# Patient Record
Sex: MALE | Race: WHITE
[De-identification: names, ages, dates, MRNs, and addresses within clinical notes are randomized per-mention and may not be internally consistent; named-entity substitution may affect disease eponyms.]

---

## 2019-07-28 ENCOUNTER — HOSPITAL ENCOUNTER (EMERGENCY)
Dept: HOSPITAL 91 - FTE | Age: 34
Discharge: HOME | End: 2019-07-28
Payer: MEDICAID

## 2019-07-28 ENCOUNTER — HOSPITAL ENCOUNTER (EMERGENCY)
Dept: HOSPITAL 10 - FTE | Age: 34
Discharge: HOME | End: 2019-07-28
Payer: MEDICAID

## 2019-07-28 VITALS
BODY MASS INDEX: 30.78 KG/M2 | HEIGHT: 65 IN | HEIGHT: 65 IN | WEIGHT: 184.75 LBS | WEIGHT: 184.75 LBS | BODY MASS INDEX: 30.78 KG/M2

## 2019-07-28 VITALS — SYSTOLIC BLOOD PRESSURE: 121 MMHG | RESPIRATION RATE: 18 BRPM | HEART RATE: 93 BPM | DIASTOLIC BLOOD PRESSURE: 78 MMHG

## 2019-07-28 DIAGNOSIS — L05.92: Primary | ICD-10-CM

## 2019-07-28 PROCEDURE — 99283 EMERGENCY DEPT VISIT LOW MDM: CPT

## 2019-07-28 PROCEDURE — 10080 I&D PILONIDAL CYST SIMPLE: CPT

## 2019-07-28 RX ADMIN — ONDANSETRON 1 MG: 4 TABLET, ORALLY DISINTEGRATING ORAL at 20:41

## 2019-07-28 RX ADMIN — IBUPROFEN 1 MG: 800 TABLET, FILM COATED ORAL at 20:42

## 2019-07-28 RX ADMIN — LIDOCAINE HYDROCHLORIDE AND EPINEPHRINE 1 ML: 10; 10 INJECTION, SOLUTION INFILTRATION; PERINEURAL at 20:55

## 2019-07-28 RX ADMIN — HYDROCODONE BITARTRATE AND ACETAMINOPHEN 1 TAB: 10; 325 TABLET ORAL at 20:42

## 2019-07-28 RX ADMIN — SULFAMETHOXAZOLE AND TRIMETHOPRIM 1 TAB: 800; 160 TABLET ORAL at 20:59

## 2019-07-28 RX ADMIN — CEPHALEXIN 1 MG: 500 CAPSULE ORAL at 20:59

## 2019-07-28 NOTE — ERD
ER Documentation


Chief Complaint


Chief Complaint





states abscess coccyx area x 5 days





HPI


This is a 34-year-old male who presents emergency department with complaints of 


abscess to his tailbone that started 5 days ago. 





Denies headache, head injury, loss of consciousness, dizziness, neck pain, neck 


stiffness, throat pain, difficulty swallowing, difficulty breathing lying flat, 


shoulder pain, chest pain, back pain, abdominal pain, nausea, vomiting, 


constipation, diarrhea, urinary symptoms, loss of bowel and bladder control, 


trauma, injury, falls, difficulty walking due to pain, numbness or tingling 


sensation, calf pain, recent travel, recent major surgery in the last 3 weeks, 


calf pain, recent long travel, recent exposure to any illness, recent antibiotic


use in the last 3 months, fever, chills, seizures.





Past medical history: Denies.


Surgical history: Denies.


Social: Denies smoking, use of alcoholic beverages, use of illegal drugs.





ROS


All systems reviewed and are negative except as per history of present illness.





Medications


Home Meds


Active Scripts


Hydrocodone/Acetaminophen (Norco  Tablet) 1 Each Tablet, 1 TAB PO Q6H PRN 


for SEVERE PAIN LEVEL 7-10, #5 TAB


   Prov:PASILABAN,EVELIAAR F         7/28/19


Ibuprofen* (Motrin*) 800 Mg Tab, 800 MG PO Q8 PRN for PAIN AND OR ELEVATED TEMP,


#30 TAB


   Prov:PASILABAN,EVELIAAR F         7/28/19


Cephalexin* (Keflex*) 500 Mg Capsule, 500 MG PO TID for 7 Days, CAP


   Prov:PASILABAN,KLAR F         7/28/19


Sulfamethoxazole/Trimethoprim* (Bactrim Ds* Tablet) 1 Each Tablet, 1 TAB PO BID 


for 7 Days, #14 TAB


   Prov:PASILABAN,EVELIAAR F         7/28/19


Sulfamethoxazole-Trimethoprim* (Bactrim* DS) 800-160 Mg Tab, 1 TAB PO BID for 7 


Days, TAB


   Prov:PROYUDITH YOU-C         11/4/16


Cephalexin* (Keflex*) 500 Mg Capsule, 500 MG PO QID for 7 Days, CAP


   Prov:PROYUDITH YOU-C         11/4/16


Ibuprofen* (Motrin*) 800 Mg Tab, 800 MG PO Q6, #30 TAB


   Prov:PROYUDITH YOU-C         11/4/16


Hydrocodone/Acetaminophen (Norco  Tablet) 1 Each Tablet, 1 TAB PO Q6H PRN 


for PAIN, #12 TAB


   Prov:MERONYUDITH KUMAR PA-C         11/4/16





Allergies


Allergies:  


Coded Allergies:  


     No Known Allergy (Unverified , 11/4/16)





PMhx/Soc


Hx Alcohol Use:  No


Hx Substance Use:  No


Hx Tobacco Use:  No





Physical Exam


Vitals





Vital Signs


  Date      Temp  Pulse  Resp  B/P (MAP)   Pulse Ox  O2          O2 Flow    FiO2


Time                                                 Delivery    Rate


   7/28/19  98.3     93    18      121/78        98


     21:54                           (92)


   7/28/19  99.0    107    20      131/90        98


     19:26                          (104)





Physical Exam


Const:   No acute distress


Head:   Atraumatic 


Eyes:    Normal Conjunctiva. 


ENT:    Normal External Ears, Nose and Mouth.


Neck:               Full range of motion. No meningismus.


Resp:   Clear to auscultation bilaterally


Cardio:   Regular rate and rhythm, no murmurs


Abd:    Soft, non tender, non distended. Normal bowel sounds.  Negative Ornelas 


sign.  Negative Markle sign (heel jar test).  Negative psoas sign.  Negative 


Rovsing sign.  Able to jump 10 times without developing lower abdominal pain.  


No CVA tenderness.  Ambulatory with steady gait and without pain to abdomen.


Skin:   No petechiae or rashes.  Color appears normal for ethnicity.  No skin 


tenting.  No signs of severe dehydration.  Noted swelling/redness to coccyx area


with warmness to touch.  Rectal area: No hemorrhoids.  No signs of perirectal 


abscess.


Back:   No midline or flank tenderness


Ext:    No cyanosis, or edema


Neur:   Awake and alert.  No neurological deficits.


Psych:    Normal Mood and Affect


Results 24 hrs





Current Medications


 Medications
   Dose
          Sig/Mo
       Start Time
   Status  Last


 (Trade)       Ordered        Route
 PRN     Stop Time              Admin
Dose


                              Reason                                Admin


 Lidocaine/
    50 ml          ONCE  ONCE
    7/28/19       DC       



Epinephrine
                  INJ
           20:30



(Xylocaine                                   7/28/19 20:31


1%/
 Epi


(Mdv))


                1 tab          ONCE  ONCE
    7/28/19       DC           7/28/19


Acetaminophen                 PO
            20:30
                       20:42



/
                                           7/28/19 20:31


Hydrocodone


Bitart



(Norco


(10/325))


 Ondansetron    4 mg           ONCE  STAT
    7/28/19       DC           7/28/19


HCl
  (Zofran                 ODT
           20:24
                       20:41



Odt)                                         7/28/19 20:26


 Ibuprofen
     800 mg         ONCE  ONCE
    7/28/19       DC           7/28/19


(Motrin)                      PO
            20:30
                       20:42



                                             7/28/19 20:31


 Cephalexin
    500 mg         ONCE  ONCE
    7/28/19       DC           7/28/19


(Keflex)                      PO
            21:00
                       20:59



                                             7/28/19 21:01


                1 tab          ONCE  ONCE
    7/28/19       DC           7/28/19


Trimethoprim/                 PO
            21:00
                       20:59




                                            7/28/19 21:01


Sulfamethoxaz


ole



(Bactrim


(Ds))








Procedures/MDM


Diagnostic tests: Clinical exam.





Treatment: Norco p.o.  Motrin.





Procedure: Incision and drainage of pilonidal cyst.  Verbal consent was taken 


from the patient and family member.


Betadine prep.  Lidocaine 1% with epi 2 cc subcu.  Use scalpel to incised the 


cyst.  Drained copious mucopurulent drainage.  Packed with iodoform.





Dressing was applied by EMT.





Re-evaluation: No active bleeding.  Ambulatory with steady gait and without 


pain.  Stated that he feels much better this time and that he is ready to go 


home.  Stated that he is comfortable to go home.





Differential diagnosis


I have low suspicion for sepsis, perirectal abscess.





Final diagnosis: Pilonidal cyst with incision and drainage.





Prescription: Bactrim.  Keflex.  Norco.  Motrin.





Follow-up with PCP in the next 24-48 hours.  Come back in 2 days for removal of 


packing and/or repacking. Come back here in the emergency department for any new


symptoms or any worsening symptoms.  





All questions and concerns were answered.  Patient and family members verbalized


understanding and agreed with plan of care.  





Hemodynamically stable on discharge.





Departure


Diagnosis:  


   Primary Impression:  


   Pilonidal cyst with abscess


Condition:  Stable





Additional Instructions:  


Follow-up with PCP in the next 24-48 hours.  Come back in 2 days for removal of 


packing and/or repacking. Come back here in the emergency department for any new


symptoms or any worsening symptoms.











REUBEN LOCKETT               Jul 28, 2019 20:26

## 2019-07-31 ENCOUNTER — HOSPITAL ENCOUNTER (EMERGENCY)
Dept: HOSPITAL 10 - FTE | Age: 34
Discharge: HOME | End: 2019-07-31
Payer: MEDICAID

## 2019-07-31 ENCOUNTER — HOSPITAL ENCOUNTER (EMERGENCY)
Dept: HOSPITAL 91 - FTE | Age: 34
Discharge: HOME | End: 2019-07-31
Payer: MEDICAID

## 2019-07-31 VITALS — SYSTOLIC BLOOD PRESSURE: 144 MMHG | RESPIRATION RATE: 18 BRPM | HEART RATE: 98 BPM | DIASTOLIC BLOOD PRESSURE: 95 MMHG

## 2019-07-31 VITALS
WEIGHT: 186.51 LBS | WEIGHT: 186.51 LBS | HEIGHT: 65 IN | HEIGHT: 65 IN | BODY MASS INDEX: 31.07 KG/M2 | BODY MASS INDEX: 31.07 KG/M2

## 2019-07-31 DIAGNOSIS — Z48.01: Primary | ICD-10-CM

## 2019-07-31 PROCEDURE — 99281 EMR DPT VST MAYX REQ PHY/QHP: CPT

## 2019-07-31 NOTE — ERD
ER Documentation


Chief Complaint


Chief Complaint





recheck abscess lower back, was here 2 days ago





HPI


34-year-old male no significant past history presents for recheck of a pilonidal


abscess that was drained about 2 days ago here in the ER.  She denies any fevers


or chills.  Denies chest pain or shortness of breath.  He thinks that the wound 


is healing well.  He is on antibiotics currently.  No other modifying factors 


noted.  No other treatments tried.





ROS


All systems reviewed and are negative except as per history of present illness.





Medications


Home Meds


Active Scripts


Hydrocodone/Acetaminophen (Norco  Tablet) 1 Each Tablet, 1 TAB PO Q6H PRN 


for SEVERE PAIN LEVEL 7-10, #5 TAB


   Prov:PASILABANREUBEN F         7/28/19


Ibuprofen* (Motrin*) 800 Mg Tab, 800 MG PO Q8 PRN for PAIN AND OR ELEVATED TEMP,


#30 TAB


   Prov:PASILABANEVELIAAR F         7/28/19


Cephalexin* (Keflex*) 500 Mg Capsule, 500 MG PO TID for 7 Days, CAP


   Prov:PASILABAN,EVELIAAR F         7/28/19


Sulfamethoxazole/Trimethoprim* (Bactrim Ds* Tablet) 1 Each Tablet, 1 TAB PO BID 


for 7 Days, #14 TAB


   Prov:PASILABANREUBEN F         7/28/19


Sulfamethoxazole-Trimethoprim* (Bactrim* DS) 800-160 Mg Tab, 1 TAB PO BID for 7 


Days, TAB


   Prov:YUDITH CHANCE-C         11/4/16


Cephalexin* (Keflex*) 500 Mg Capsule, 500 MG PO QID for 7 Days, CAP


   Prov:PROYUDITH YOU-C         11/4/16


Ibuprofen* (Motrin*) 800 Mg Tab, 800 MG PO Q6, #30 TAB


   Prov:PROYUDITH YOU-C         11/4/16


Hydrocodone/Acetaminophen (Norco  Tablet) 1 Each Tablet, 1 TAB PO Q6H PRN 


for PAIN, #12 TAB


   Prov:YUDITH CHANCE-C         11/4/16





Allergies


Allergies:  


Coded Allergies:  


     No Known Allergy (Unverified , 11/4/16)





PMhx/Soc


Medical and Surgical Hx:  pt denies Medical Hx, pt denies Surgical Hx


Hx Alcohol Use:  No


Hx Substance Use:  No


Hx Tobacco Use:  No


Smoking Status:  Never smoker





FmHx


Family History:  No coronary disease





Physical Exam


Vitals





Vital Signs


  Date      Temp  Pulse  Resp  B/P (MAP)   Pulse Ox  O2          O2 Flow    FiO2


Time                                                 Delivery    Rate


   7/31/19  98.8     98    18      144/95        98


     18:46                          (111)





Physical Exam


Const:   No acute distress


Resp:   Clear to auscultation bilaterally


Cardio:   Regular rate and rhythm, no murmurs


Skin:   Posterior buttock incision and drainage sites examined, there is no 


erythema.  The packing was removed with mild discharge.


Back:   No midline or flank tenderness


Ext:    No cyanosis, or edema


Neur:   Awake and alert


Psych:    Normal Mood and Affect





Procedures/MDM


Medical Decision Making:





Patient presents for recheck of a abscess that was incised and drained here in 


the ER 2 days ago.





Patient appeared well on physical exam.  


The packing was removed here in the ER.


Site appears to be healing well.


No need for repacking currently.








Wound care instructions given.








Patient advised to follow up with PCP in 1-2 days. Patient advised to return to 


ED for new or worsening symptoms. Patient stable on discharge from the ED.








Disclaimer: Inadvertent spelling and grammatical errors are likely due to 


EHR/dictation software use and do not reflect on the overall quality of patient 


care. Also, please note that the electronic time recorded on this note does not 


necessarily reflect the actual time of the patient encounter.





Departure


Diagnosis:  


   Primary Impression:  


   Encounter for wound re-check


Condition:  Fair


Patient Instructions:  Wound Care, Wound Packing


Referrals:  


[facility]


YOU HAVE RECEIVED A MEDICAL SCREENING EXAM AND THE RESULTS INDICATE THAT YOU DO 


NOT HAVE A CONDITION THAT REQUIRES URGENT TREATMENT IN THE EMERGENCY DEPARTMENT.





FURTHER EVALUATION AND TREATMENT OF YOUR CONDITION CAN WAIT UNTIL YOU ARE SEEN 


IN YOUR DOCTORS OFFICE WITHIN THE NEXT 1-2 DAYS. IT IS YOUR RESPONSIBILITY TO 


MAKE AN APPOINTMENT FOR FOLOW-UP CARE.





IF YOU HAVE A PRIMARY DOCTOR


--you should call your primary doctor and schedule an appointment





IF YOU DO NOT HAVE A PRIMARY DOCTOR YOU CAN CALL OUR PHYSICIAN REFERRAL HOTLINE 


AT


 [phone] 





IF YOU CAN NOT AFFORD TO SEE A PHYSICIAN YOU CAN CHOSE FROM THE FOLLOWING [facility] [phone] [address] [facility]. [facility] [phone] [address] [facility]. [address]





[facility] [phone] [address] [facility] [phone] [address] [facility] [phone] [address] [facility]. [facility] [phone] [address]





Additional Instructions:  


Llame al doctor MAANA y bharat zahra NICKY PARA DENTRO DE 1-2 COLIN.Dgale a la 


secretaria que nosotros le instruimos hacer esta nicky.Avise o llame si garcia 


condicin se empeora antes de la nicky. Regresa aqui si peor o no mejor.











PAXTON READ DO                 Jul 31, 2019 20:36